# Patient Record
Sex: MALE | Race: WHITE | NOT HISPANIC OR LATINO
[De-identification: names, ages, dates, MRNs, and addresses within clinical notes are randomized per-mention and may not be internally consistent; named-entity substitution may affect disease eponyms.]

---

## 2018-06-30 ENCOUNTER — CHARTING TRANS (OUTPATIENT)
Dept: OTHER | Age: 48
End: 2018-06-30

## 2018-06-30 ASSESSMENT — PAIN SCALES - GENERAL: PAINLEVEL_OUTOF10: 0

## 2018-10-31 VITALS
WEIGHT: 210 LBS | TEMPERATURE: 98.1 F | SYSTOLIC BLOOD PRESSURE: 120 MMHG | BODY MASS INDEX: 26.11 KG/M2 | HEART RATE: 71 BPM | HEIGHT: 75 IN | DIASTOLIC BLOOD PRESSURE: 82 MMHG | RESPIRATION RATE: 18 BRPM

## 2018-12-06 ENCOUNTER — WALK IN (OUTPATIENT)
Dept: URGENT CARE | Age: 48
End: 2018-12-06

## 2018-12-06 VITALS
HEART RATE: 69 BPM | BODY MASS INDEX: 27.71 KG/M2 | OXYGEN SATURATION: 96 % | WEIGHT: 215.94 LBS | SYSTOLIC BLOOD PRESSURE: 104 MMHG | HEIGHT: 74 IN | RESPIRATION RATE: 20 BRPM | TEMPERATURE: 98.2 F | DIASTOLIC BLOOD PRESSURE: 86 MMHG

## 2018-12-06 DIAGNOSIS — J02.9 VIRAL PHARYNGITIS: Primary | ICD-10-CM

## 2018-12-06 LAB
FLUAV AG UPPER RESP QL IA.RAPID: NEGATIVE
FLUBV AG UPPER RESP QL IA.RAPID: NEGATIVE
S PYO AG THROAT QL: NEGATIVE

## 2018-12-06 PROCEDURE — 99204 OFFICE O/P NEW MOD 45 MIN: CPT | Performed by: NURSE PRACTITIONER

## 2018-12-06 PROCEDURE — 87804 INFLUENZA ASSAY W/OPTIC: CPT | Performed by: NURSE PRACTITIONER

## 2018-12-06 PROCEDURE — 87880 STREP A ASSAY W/OPTIC: CPT | Performed by: NURSE PRACTITIONER

## 2018-12-06 ASSESSMENT — ENCOUNTER SYMPTOMS
RHINORRHEA: 0
FATIGUE: 1
CHILLS: 1
NAUSEA: 0
SINUS PAIN: 0
SORE THROAT: 1
VOMITING: 0
SINUS PRESSURE: 0
ABDOMINAL PAIN: 0
COUGH: 0

## 2023-02-22 ENCOUNTER — ORDER TRANSCRIPTION (OUTPATIENT)
Dept: ADMINISTRATIVE | Facility: HOSPITAL | Age: 53
End: 2023-02-22

## 2023-02-22 DIAGNOSIS — Z13.6 SCREENING FOR CARDIOVASCULAR CONDITION: Primary | ICD-10-CM

## 2023-03-07 ENCOUNTER — HOSPITAL ENCOUNTER (OUTPATIENT)
Dept: CT IMAGING | Age: 53
Discharge: HOME | End: 2023-03-07
Attending: INTERNAL MEDICINE

## 2023-03-07 DIAGNOSIS — Z13.6 SCREENING FOR CARDIOVASCULAR CONDITION: ICD-10-CM

## 2023-03-07 NOTE — PROGRESS NOTES
Date of Service 3/7/2023    JULES BROUSSARD  Date of Birth 8/17/1970    Patient Age: 47year old    PCP: MD Amanda Lylesharvinder [phone]  [facility] [address]    CARDIOLOGIST:  Dr. Booker Romeo Type  Type Scan/Screening: Heart Scan  Preliminary Heart Scan Score: 0 (Last Heart Scan done in 2016; score=0)           Lipid Profile  Cholesterol: 163, done on 6/17/2021. HDL Cholesterol: 41, done on 6/17/2021. LDL Cholesterol: 107, done on 6/17/2021. TriGlycerides 74, done on 6/17/2021. Recent Cholesterol Labs done in December 2022. Nurse Review  Risk factor information and results reviewed with Nurse: Yes    Recommended Follow Up:  Consult your physician regarding[de-identified]   Final Heart Scan Report; Discuss potential for Incidental Finding    Free PV Screening offered to patient. (Patient to be scheduled for a free PV Stroke Screening Carotid and Abdominal Aorta Ultrasound.)      Recommendations for Change:  Nutrition Changes: Low Saturated Fat;Low Fat Dairy; Increase Fiber     Cholesterol Modification (goal of therapy depends upon your risk):   Decrease LDL (Lousy/Bad) Ideal <100    Exercise: Enhance Current Program           Repeat Heart Scan: 5 years if Calcium Score is 0.0     Other[de-identified] Today's blood pressure:  112/72 (left arm)    [facility]avian Recommended Resources:  Recommended Resources: Upcoming Classes, Medical Services and [facility]. Health. Sarah Fernandez, RN        Please Contact the Nurse Heart Line with any Questions or Concerns [phone].

## 2023-03-08 ENCOUNTER — ORDER TRANSCRIPTION (OUTPATIENT)
Dept: ADMINISTRATIVE | Facility: HOSPITAL | Age: 53
End: 2023-03-08

## 2023-03-08 DIAGNOSIS — Z13.9 ENCOUNTER FOR SCREENING: Primary | ICD-10-CM

## 2023-06-09 ENCOUNTER — HOSPITAL ENCOUNTER (OUTPATIENT)
Dept: ULTRASOUND IMAGING | Age: 53
Discharge: HOME | End: 2023-06-09
Attending: FAMILY MEDICINE

## 2023-06-09 DIAGNOSIS — Z13.9 ENCOUNTER FOR SCREENING: ICD-10-CM

## 2023-06-09 NOTE — PROGRESS NOTES
Date of Service 6/9/2023    JULES BROUSSARD  Date of Birth 8/17/1970    Patient Age: 47year old    [facility] [address] [address]    Consult Type  Type Scan/Screening: PV Screening (No abdominal aortic aneurysm.)        Left Carotid Artery: Normal  Right Carotid Artery: < 50% Narrowing         Nurse Review  Risk factor information and results reviewed with Nurse: Yes    Recommended Follow Up:  Consult your physician regarding[de-identified]   Final Peripheral Vascular Stroke Screen Report; Discuss potential for Incidental Finding        Recommendations for Change:  Nutrition Changes: Low Saturated Fat;Low Fat Dairy; Increase Fiber     Cholesterol Modification (goal of therapy depends upon your risk): Increase HDL (Healthy/Good) Normal >45 Men >55 Women;  Decrease LDL (Lousy/Bad) Ideal <100    Exercise: Enhance Current Program              Repeat PV Screening:   3 Years; Aorta Only; Abnormal PV Screening results indicates a need for additional testing, to be ordered by your PCP       [facility]avian Recommended Resources:  Recommended Resources: Upcoming Classes, Medical Services and [facility]. Health. Jerrod Hernandez RN        Please Contact the Nurse Heart Line with any Questions or Concerns [phone].

## 2023-07-15 ENCOUNTER — APPOINTMENT (OUTPATIENT)
Dept: GENERAL RADIOLOGY | Facility: HOSPITAL | Age: 53
End: 2023-07-15
Payer: COMMERCIAL

## 2023-07-15 ENCOUNTER — HOSPITAL ENCOUNTER (EMERGENCY)
Facility: HOSPITAL | Age: 53
Discharge: HOME | End: 2023-07-15
Attending: EMERGENCY MEDICINE
Payer: COMMERCIAL

## 2023-07-15 ENCOUNTER — APPOINTMENT (OUTPATIENT)
Dept: CT IMAGING | Facility: HOSPITAL | Age: 53
End: 2023-07-15
Attending: EMERGENCY MEDICINE
Payer: COMMERCIAL

## 2023-07-15 VITALS
SYSTOLIC BLOOD PRESSURE: 118 MMHG | TEMPERATURE: 98 F | WEIGHT: 206 LBS | HEIGHT: 74 IN | DIASTOLIC BLOOD PRESSURE: 88 MMHG | HEART RATE: 69 BPM | OXYGEN SATURATION: 100 % | BODY MASS INDEX: 26.44 KG/M2 | RESPIRATION RATE: 20 BRPM

## 2023-07-15 DIAGNOSIS — R04.2 HEMOPTYSIS: Primary | ICD-10-CM

## 2023-07-15 LAB
ALBUMIN SERPL-MCNC: 3.9 G/DL (ref 3.4–5)
ALBUMIN/GLOB SERPL: 1 {RATIO} (ref 1–2)
ALP LIVER SERPL-CCNC: 63 U/L
ALT SERPL-CCNC: 34 U/L
ANION GAP SERPL CALC-SCNC: 5 MMOL/L (ref 0–18)
ANTIBODY SCREEN: NEGATIVE
APTT PPP: 27.4 SECONDS (ref 23.3–35.6)
AST SERPL-CCNC: 16 U/L (ref 15–37)
BASOPHILS # BLD AUTO: 0.02 X10(3) UL (ref 0–0.2)
BASOPHILS NFR BLD AUTO: 0.6 %
BILIRUB SERPL-MCNC: 0.2 MG/DL (ref 0.1–2)
BUN BLD-MCNC: 18 MG/DL (ref 7–18)
CALCIUM BLD-MCNC: 9.1 MG/DL (ref 8.5–10.1)
CHLORIDE SERPL-SCNC: 107 MMOL/L (ref 98–112)
CO2 SERPL-SCNC: 29 MMOL/L (ref 21–32)
CREAT BLD-MCNC: 1 MG/DL
EOSINOPHIL # BLD AUTO: 0.1 X10(3) UL (ref 0–0.7)
EOSINOPHIL NFR BLD AUTO: 3.2 %
ERYTHROCYTE [DISTWIDTH] IN BLOOD BY AUTOMATED COUNT: 12.5 %
GFR SERPLBLD BASED ON 1.73 SQ M-ARVRAT: 91 ML/MIN/1.73M2 (ref 60–?)
GLOBULIN PLAS-MCNC: 3.9 G/DL (ref 2.8–4.4)
GLUCOSE BLD-MCNC: 117 MG/DL (ref 70–99)
HCT VFR BLD AUTO: 45 %
HGB BLD-MCNC: 14.1 G/DL
IMM GRANULOCYTES # BLD AUTO: 0 X10(3) UL (ref 0–1)
IMM GRANULOCYTES NFR BLD: 0 %
INR BLD: 0.88 (ref 0.85–1.16)
LACTATE SERPL-SCNC: 1.1 MMOL/L (ref 0.4–2)
LYMPHOCYTES # BLD AUTO: 1.31 X10(3) UL (ref 1–4)
LYMPHOCYTES NFR BLD AUTO: 41.7 %
MCH RBC QN AUTO: 25.7 PG (ref 26–34)
MCHC RBC AUTO-ENTMCNC: 31.3 G/DL (ref 31–37)
MCV RBC AUTO: 82.1 FL
MONOCYTES # BLD AUTO: 0.21 X10(3) UL (ref 0.1–1)
MONOCYTES NFR BLD AUTO: 6.7 %
NEUTROPHILS # BLD AUTO: 1.5 X10 (3) UL (ref 1.5–7.7)
NEUTROPHILS # BLD AUTO: 1.5 X10(3) UL (ref 1.5–7.7)
NEUTROPHILS NFR BLD AUTO: 47.8 %
OSMOLALITY SERPL CALC.SUM OF ELEC: 295 MOSM/KG (ref 275–295)
PLATELET # BLD AUTO: 237 10(3)UL (ref 150–450)
POTASSIUM SERPL-SCNC: 4 MMOL/L (ref 3.5–5.1)
PROT SERPL-MCNC: 7.8 G/DL (ref 6.4–8.2)
PROTHROMBIN TIME: 12 SECONDS (ref 11.6–14.8)
RBC # BLD AUTO: 5.48 X10(6)UL
RH BLOOD TYPE: POSITIVE
SODIUM SERPL-SCNC: 141 MMOL/L (ref 136–145)
TROPONIN I HIGH SENSITIVITY: 5 NG/L
WBC # BLD AUTO: 3.1 X10(3) UL (ref 4–11)

## 2023-07-15 PROCEDURE — 86850 RBC ANTIBODY SCREEN: CPT

## 2023-07-15 PROCEDURE — 85730 THROMBOPLASTIN TIME PARTIAL: CPT | Performed by: EMERGENCY MEDICINE

## 2023-07-15 PROCEDURE — 85610 PROTHROMBIN TIME: CPT | Performed by: EMERGENCY MEDICINE

## 2023-07-15 PROCEDURE — 86901 BLOOD TYPING SEROLOGIC RH(D): CPT

## 2023-07-15 PROCEDURE — 87040 BLOOD CULTURE FOR BACTERIA: CPT | Performed by: EMERGENCY MEDICINE

## 2023-07-15 PROCEDURE — 86900 BLOOD TYPING SEROLOGIC ABO: CPT

## 2023-07-15 PROCEDURE — 71260 CT THORAX DX C+: CPT | Performed by: EMERGENCY MEDICINE

## 2023-07-15 PROCEDURE — 71045 X-RAY EXAM CHEST 1 VIEW: CPT

## 2023-07-15 PROCEDURE — 83605 ASSAY OF LACTIC ACID: CPT | Performed by: EMERGENCY MEDICINE

## 2023-07-15 PROCEDURE — 84484 ASSAY OF TROPONIN QUANT: CPT

## 2023-07-15 PROCEDURE — 85025 COMPLETE CBC W/AUTO DIFF WBC: CPT

## 2023-07-15 PROCEDURE — 99284 EMERGENCY DEPT VISIT MOD MDM: CPT

## 2023-07-15 PROCEDURE — 36415 COLL VENOUS BLD VENIPUNCTURE: CPT

## 2023-07-15 PROCEDURE — 80053 COMPREHEN METABOLIC PANEL: CPT

## 2023-07-15 NOTE — ED INITIAL ASSESSMENT (HPI)
Pt here with co recent URI, congestion, cough and fatigue. Pt reports that this morning he started coughing up blood tinged sputum.

## 2025-02-22 ENCOUNTER — HOSPITAL ENCOUNTER (EMERGENCY)
Facility: HOSPITAL | Age: 55
Discharge: HOME | End: 2025-02-22
Attending: EMERGENCY MEDICINE
Payer: COMMERCIAL

## 2025-02-22 ENCOUNTER — APPOINTMENT (OUTPATIENT)
Dept: CT IMAGING | Facility: HOSPITAL | Age: 55
End: 2025-02-22
Attending: EMERGENCY MEDICINE
Payer: COMMERCIAL

## 2025-02-22 VITALS
TEMPERATURE: 97 F | SYSTOLIC BLOOD PRESSURE: 134 MMHG | RESPIRATION RATE: 18 BRPM | DIASTOLIC BLOOD PRESSURE: 84 MMHG | BODY MASS INDEX: 27.98 KG/M2 | OXYGEN SATURATION: 100 % | HEIGHT: 75 IN | HEART RATE: 70 BPM | WEIGHT: 225 LBS

## 2025-02-22 DIAGNOSIS — N23 RENAL COLIC: Primary | ICD-10-CM

## 2025-02-22 DIAGNOSIS — N20.0 KIDNEY STONE: ICD-10-CM

## 2025-02-22 LAB
ALBUMIN SERPL-MCNC: 4.7 G/DL (ref 3.2–4.8)
ALBUMIN/GLOB SERPL: 2.1 {RATIO} (ref 1–2)
ALP LIVER SERPL-CCNC: 61 U/L
ALT SERPL-CCNC: 39 U/L
ANION GAP SERPL CALC-SCNC: 8 MMOL/L (ref 0–18)
AST SERPL-CCNC: 26 U/L (ref ?–34)
BASOPHILS # BLD AUTO: 0.04 X10(3) UL (ref 0–0.2)
BASOPHILS NFR BLD AUTO: 0.3 %
BILIRUB SERPL-MCNC: 0.6 MG/DL (ref 0.3–1.2)
BILIRUB UR QL STRIP.AUTO: NEGATIVE
BUN BLD-MCNC: 14 MG/DL (ref 9–23)
CALCIUM BLD-MCNC: 9.9 MG/DL (ref 8.7–10.6)
CHLORIDE SERPL-SCNC: 101 MMOL/L (ref 98–112)
CLARITY UR REFRACT.AUTO: CLEAR
CO2 SERPL-SCNC: 28 MMOL/L (ref 21–32)
CREAT BLD-MCNC: 1.35 MG/DL
EGFRCR SERPLBLD CKD-EPI 2021: 63 ML/MIN/1.73M2 (ref 60–?)
EOSINOPHIL # BLD AUTO: 0.11 X10(3) UL (ref 0–0.7)
EOSINOPHIL NFR BLD AUTO: 0.9 %
ERYTHROCYTE [DISTWIDTH] IN BLOOD BY AUTOMATED COUNT: 12.6 %
GLOBULIN PLAS-MCNC: 2.2 G/DL (ref 2–3.5)
GLUCOSE BLD-MCNC: 136 MG/DL (ref 70–99)
GLUCOSE UR STRIP.AUTO-MCNC: NORMAL MG/DL
HCT VFR BLD AUTO: 40.7 %
HGB BLD-MCNC: 13.9 G/DL
IMM GRANULOCYTES # BLD AUTO: 0.03 X10(3) UL (ref 0–1)
IMM GRANULOCYTES NFR BLD: 0.3 %
KETONES UR STRIP.AUTO-MCNC: NEGATIVE MG/DL
LEUKOCYTE ESTERASE UR QL STRIP.AUTO: NEGATIVE
LYMPHOCYTES # BLD AUTO: 1.23 X10(3) UL (ref 1–4)
LYMPHOCYTES NFR BLD AUTO: 10.6 %
MCH RBC QN AUTO: 27.1 PG (ref 26–34)
MCHC RBC AUTO-ENTMCNC: 34.2 G/DL (ref 31–37)
MCV RBC AUTO: 79.3 FL
MONOCYTES # BLD AUTO: 0.73 X10(3) UL (ref 0.1–1)
MONOCYTES NFR BLD AUTO: 6.3 %
NEUTROPHILS # BLD AUTO: 9.46 X10 (3) UL (ref 1.5–7.7)
NEUTROPHILS # BLD AUTO: 9.46 X10(3) UL (ref 1.5–7.7)
NEUTROPHILS NFR BLD AUTO: 81.6 %
NITRITE UR QL STRIP.AUTO: NEGATIVE
OSMOLALITY SERPL CALC.SUM OF ELEC: 287 MOSM/KG (ref 275–295)
PH UR STRIP.AUTO: 5.5 [PH] (ref 5–8)
PLATELET # BLD AUTO: 223 10(3)UL (ref 150–450)
POTASSIUM SERPL-SCNC: 4.6 MMOL/L (ref 3.5–5.1)
PROT SERPL-MCNC: 6.9 G/DL (ref 5.7–8.2)
PROT UR STRIP.AUTO-MCNC: NEGATIVE MG/DL
RBC # BLD AUTO: 5.13 X10(6)UL
RBC UR QL AUTO: NEGATIVE
SODIUM SERPL-SCNC: 137 MMOL/L (ref 136–145)
SP GR UR STRIP.AUTO: 1.02 (ref 1–1.03)
UROBILINOGEN UR STRIP.AUTO-MCNC: NORMAL MG/DL
WBC # BLD AUTO: 11.6 X10(3) UL (ref 4–11)

## 2025-02-22 PROCEDURE — 99285 EMERGENCY DEPT VISIT HI MDM: CPT

## 2025-02-22 PROCEDURE — 99284 EMERGENCY DEPT VISIT MOD MDM: CPT

## 2025-02-22 PROCEDURE — 74176 CT ABD & PELVIS W/O CONTRAST: CPT | Performed by: EMERGENCY MEDICINE

## 2025-02-22 PROCEDURE — 85025 COMPLETE CBC W/AUTO DIFF WBC: CPT

## 2025-02-22 PROCEDURE — 81003 URINALYSIS AUTO W/O SCOPE: CPT | Performed by: EMERGENCY MEDICINE

## 2025-02-22 PROCEDURE — 80053 COMPREHEN METABOLIC PANEL: CPT

## 2025-02-22 PROCEDURE — 85025 COMPLETE CBC W/AUTO DIFF WBC: CPT | Performed by: EMERGENCY MEDICINE

## 2025-02-22 PROCEDURE — 96374 THER/PROPH/DIAG INJ IV PUSH: CPT

## 2025-02-22 PROCEDURE — 96361 HYDRATE IV INFUSION ADD-ON: CPT

## 2025-02-22 PROCEDURE — 80053 COMPREHEN METABOLIC PANEL: CPT | Performed by: EMERGENCY MEDICINE

## 2025-02-22 RX ORDER — ONDANSETRON 4 MG/1
4 TABLET, ORALLY DISINTEGRATING ORAL EVERY 4 HOURS PRN
Qty: 10 TABLET | Refills: 0 | Status: SHIPPED | OUTPATIENT
Start: 2025-02-22 | End: 2025-03-01

## 2025-02-22 RX ORDER — TAMSULOSIN HYDROCHLORIDE 0.4 MG/1
0.4 CAPSULE ORAL DAILY
Qty: 7 CAPSULE | Refills: 0 | Status: SHIPPED | OUTPATIENT
Start: 2025-02-22 | End: 2025-03-24

## 2025-02-22 RX ORDER — HYDROCODONE BITARTRATE AND ACETAMINOPHEN 5; 325 MG/1; MG/1
1-2 TABLET ORAL EVERY 4 HOURS PRN
Qty: 20 TABLET | Refills: 0 | Status: SHIPPED | OUTPATIENT
Start: 2025-02-22 | End: 2025-03-01

## 2025-02-22 RX ORDER — KETOROLAC TROMETHAMINE 30 MG/ML
30 INJECTION, SOLUTION INTRAMUSCULAR; INTRAVENOUS ONCE
Status: COMPLETED | OUTPATIENT
Start: 2025-02-22 | End: 2025-02-22

## 2025-02-22 RX ORDER — KETOROLAC TROMETHAMINE 15 MG/ML
15 INJECTION, SOLUTION INTRAMUSCULAR; INTRAVENOUS ONCE
Status: DISCONTINUED | OUTPATIENT
Start: 2025-02-22 | End: 2025-02-22

## 2025-02-22 RX ORDER — ONDANSETRON 2 MG/ML
4 INJECTION INTRAMUSCULAR; INTRAVENOUS ONCE
Status: DISCONTINUED | OUTPATIENT
Start: 2025-02-22 | End: 2025-02-22

## 2025-02-22 NOTE — ED PROVIDER NOTES
Patient Seen in: [facility] Emergency Department      History     Chief Complaint   Patient presents with    Abdomen/Flank Pain     Stated Complaint: left sided abdominal pain    Subjective:   HPI      54-year-old male, reports experiencing sudden and sharp pain, which he describes as \"killing\" him. The pain began abruptly and is located in the back and radiates down into the groin area. He has a history of kidney stones, with the last occurrence being at least a couple of years ago, and he has always passed them without the need for stents or lithotripsy. Despite the intense pain, he does not report any nausea, which he typically experiences with kidney stones if the pain is severe. He also mentions feeling constipated but confirms regular bowel movements.    Objective:     Past Medical History:    Acute sinusitis    AF (paroxysmal atrial fibrillation) ([facility])    11/4/2002 A-fib, 7/12/200 lone a-fib, dx: 2/1995 - follows with [facility]    Allergic rhinitis    Ankle pain    Ankle sprain    Arrhythmia    Back pain    lower thoracic, upper lumbar    Elevated cholesterol    Encounter for long-term (current) use of other medications    Finger dislocation    Rt, small finger dislocation as an acute injury on chronic fracture dislocation variant    Foot fracture    3/28/2000 fifth metatarsal tuberosity fracture    Heart palpitations    History of chicken pox    Hyperlipidemia    Jammed finger (interphalangeal joint)    multiple, from playing volleyball and basketball    Lumbar disc narrowing    mild disc space narrowing L5-S1 posteriorly    Other dyspnea and respiratory abnormality    Pain in limb    Pes planovalgus    Sprain of finger, right    small finger, acute-on-chronic sprain of PIP joint    Tendonitis    diffuse, second MPJ metatarsalgia              Past Surgical History:   Procedure Laterality Date    Hand/finger surgery unlisted      hand surgery after trauma in the past                Social History      Socioeconomic History    Marital status:    Tobacco Use    Smoking status: Never    Smokeless tobacco: Never   Vaping Use    Vaping status: Never Used   Substance and Sexual Activity    Alcohol use: Yes     Comment: socially     Drug use: No    Sexual activity: Yes     Partners: Female   Other Topics Concern    Caffeine Concern No    Exercise Yes    Seat Belt Yes                  Physical Exam     ED Triage Vitals [02/22/25 0328]   /81   Pulse 83   Resp 18   Temp 96.9 °F (36.1 °C)   Temp src Temporal   SpO2 98 %   O2 Device None (Room air)       Current Vitals:   Vital Signs  BP: 134/84  Pulse: 70  Resp: 18  Temp: 96.9 °F (36.1 °C)  Temp src: Temporal    Oxygen Therapy  SpO2: 100 %  O2 Device: None (Room air)        Physical Exam    Vital signs reviewed  General appearance: Patient is alert and in moderate to severe pain distress  HEENT: Pupils equal react to light extraocular muscles intact no scleral icterus, mucous membranes are moist, there is no erythema or exudate in the posterior pharynx  Neck: Supple no JVD no lymphadenopathy no meningismus no carotid bruit  CV: Regular rate and rhythm no murmur rub  Respiratory: Clear to auscultation bilaterally no crackles no wheezes no accessory muscle use  Abdomen: Patient is guarding abdomen with exam but states does not feel any tenderness when pushing to states the pain from his back is causing his stomach to hurt no hepatosplenomegaly bowel sounds are present , no pulsatile mass  Extremities: No clubbing cyanosis or edema 2+ distal pulses.  Neuro: Cranial nerves II through XII intact with no gross focal sensory or motor abnormality.      ED Course     Labs Reviewed   CBC WITH DIFFERENTIAL WITH PLATELET - Abnormal; Notable for the following components:       Result Value    WBC 11.6 (*)     MCV 79.3 (*)     Neutrophil Absolute Prelim 9.46 (*)     Neutrophil Absolute 9.46 (*)     All other components within normal limits   COMP METABOLIC PANEL (14) -  Abnormal; Notable for the following components:    Glucose 136 (*)     Creatinine 1.35 (*)     A/G Ratio 2.1 (*)     All other components within normal limits   URINALYSIS WITH CULTURE REFLEX   RAINBOW DRAW LAVENDER   RAINBOW DRAW LIGHT GREEN   RAINBOW DRAW BLUE   RAINBOW DRAW GOLD            Patient was evaluated had a CBC chemistry and urinalysis.  The urinalysis was completely unremarkable white count was 11.6 and creatinine was 1.35.  Was given some IV fluids and will get a CT scan without contrast to assess for kidney stone as he states that the symptoms are very similar as the symptoms are now radiating into his testicles     CT ABDOMEN AND PELVIS WITHOUT CONTRAST    COMPARISON: None    IMPRESSION:   3 mm stone at the left UVJ with associated mild left hydroureteronephrosis.  No other urolithiasis or right hydronephrosis.    No other acute abnormality in the abdomen or pelvis.  No appendicitis.    Patient does have a 3 mm stone left UVJ.  There is some left hydroureteronephrosis.  No sign of any urinary tract infection.  Will have him follow-up with urology.  Will give him Flomax Norco and Zofran.  He looks comfortable enough to send home and we will give him a strainer.    MDM      Differential diagnosis reflecting the complexity of care include: Renal colic, kidney stone, pyelonephritis, hydroureteronephrosis          My independent interpretation of studies of: CT scan shows a 3 mm stone left UVJ with some left hydroureteronephrosis no UTI      Shared decision making was done by myself and patient he did feel comfortable enough to go home.  Will be discharged follow-up with urology strain urine.  Return if any worsening pain            Medical Decision Making      Disposition and Plan     Clinical Impression:  1. Renal colic    2. Kidney stone         Disposition:  Discharge  2/22/2025  5:57 am    Follow-up:  [facility]   [address] [place] [id number]  [phone]  Follow  up            Medications Prescribed:  Discharge Medication List as of 2/22/2025  6:10 AM        START taking these medications    Details   tamsulosin (FLOMAX) 0.4 MG Oral Cap Take 1 capsule (0.4 mg total) by mouth daily., Normal, Disp-7 capsule, R-0      HYDROcodone-acetaminophen 5-325 MG Oral Tab Take 1-2 tablets by mouth every 4 (four) hours as needed for Pain., Normal, Disp-20 tablet, R-0      ondansetron 4 MG Oral Tablet Dispersible Take 1 tablet (4 mg total) by mouth every 4 (four) hours as needed for Nausea., Normal, Disp-10 tablet, R-0                 Supplementary Documentation: Patient was screened and evaluated during this visit.  As the treating physician attending to the patient, I determined within reasonable clinical confidence and prior to discharge, that an emergency medical condition was not or was no longer present.  There was no indication for further evaluation, treatment, or admission on an emergency basis.  Comprehensive verbal and written discharge and follow-up instructions were provided to help prevent relapse or worsening.  Patient was instructed to follow-up with primary care provider for further evaluation treatment, return immediately to ER for worsening, concerning, new, or changing/persisting symptoms.  I discussed the case with the patient and they had no questions, complaints, or concerns.  Patient was comfortable going home.      Dictation Disclaimer Note:   To increase efficiency this document may have been prepared using voice recognition technology. Every effort has been made to correct any errors made during preparation of this note. However, if a word or phrase is confusing, or does not make sense, this is likely due to a recognition error within the program which was not discovered during editing. Please do not hesitate to contact to address any significant errors.    Note to Patient:   The 21st Century Cures Act makes medical notes like these available to patients in the  interest of transparency. Please be advised this is a medical document. Medical documents are intended to carry relevant information, facts as evident, and the clinical opinion of the practitioner. The medical note is intended as peer to peer communication and may appear blunt or direct. It is written in medical language and may contain abbreviations or verbiage that are unfamiliar.

## 2025-02-22 NOTE — ED INITIAL ASSESSMENT (HPI)
Pt to the emergency room for left sided abdominal/flank pain since 9pm. Per pt he has been trying to walk around to relieve the pain with no improvement. Pt notes that the pain has been worsening all night and that it has been traveling toward his back. Pt states history of kidney stones, which feels similar but no quite the same. No trouble urinating. No vomiting, no diarrhea. Pt took ibuprofen 200 mg with ni improvement in pain.

## 2025-03-07 ENCOUNTER — LABORATORY ENCOUNTER (OUTPATIENT)
Dept: LAB | Facility: HOSPITAL | Age: 55
End: 2025-03-07
Attending: UROLOGY
Payer: COMMERCIAL

## 2025-03-07 DIAGNOSIS — Z01.818 ENCOUNTER FOR PREADMISSION TESTING: ICD-10-CM

## 2025-03-07 LAB
ANTIBODY SCREEN: NEGATIVE
RH BLOOD TYPE: POSITIVE

## 2025-03-07 PROCEDURE — 86900 BLOOD TYPING SEROLOGIC ABO: CPT

## 2025-03-07 PROCEDURE — 86850 RBC ANTIBODY SCREEN: CPT

## 2025-03-07 PROCEDURE — 86901 BLOOD TYPING SEROLOGIC RH(D): CPT

## 2025-03-14 ENCOUNTER — HOSPITAL ENCOUNTER (OUTPATIENT)
Facility: HOSPITAL | Age: 55
Discharge: HOME | End: 2025-03-15
Attending: UROLOGY | Admitting: UROLOGY
Payer: COMMERCIAL

## 2025-03-14 ENCOUNTER — ANESTHESIA EVENT (OUTPATIENT)
Dept: SURGERY | Facility: HOSPITAL | Age: 55
End: 2025-03-14
Payer: COMMERCIAL

## 2025-03-14 ENCOUNTER — ANESTHESIA (OUTPATIENT)
Dept: SURGERY | Facility: HOSPITAL | Age: 55
End: 2025-03-14
Payer: COMMERCIAL

## 2025-03-14 DIAGNOSIS — Z01.818 ENCOUNTER FOR PREADMISSION TESTING: ICD-10-CM

## 2025-03-14 DIAGNOSIS — N28.89 LEFT KIDNEY MASS: Primary | ICD-10-CM

## 2025-03-14 PROCEDURE — 0TB14ZZ EXCISION OF LEFT KIDNEY, PERCUTANEOUS ENDOSCOPIC APPROACH: ICD-10-PCS | Performed by: UROLOGY

## 2025-03-14 PROCEDURE — 88342 IMHCHEM/IMCYTCHM 1ST ANTB: CPT | Performed by: UROLOGY

## 2025-03-14 PROCEDURE — 8E0W4CZ ROBOTIC ASSISTED PROCEDURE OF TRUNK REGION, PERCUTANEOUS ENDOSCOPIC APPROACH: ICD-10-PCS | Performed by: UROLOGY

## 2025-03-14 PROCEDURE — 88307 TISSUE EXAM BY PATHOLOGIST: CPT | Performed by: UROLOGY

## 2025-03-14 PROCEDURE — 88341 IMHCHEM/IMCYTCHM EA ADD ANTB: CPT | Performed by: UROLOGY

## 2025-03-14 PROCEDURE — 76942 ECHO GUIDE FOR BIOPSY: CPT | Performed by: ANESTHESIOLOGY

## 2025-03-14 RX ORDER — NALOXONE HYDROCHLORIDE 0.4 MG/ML
0.08 INJECTION, SOLUTION INTRAMUSCULAR; INTRAVENOUS; SUBCUTANEOUS AS NEEDED
Status: DISCONTINUED | OUTPATIENT
Start: 2025-03-14 | End: 2025-03-14 | Stop reason: HOSPADM

## 2025-03-14 RX ORDER — FAMOTIDINE 20 MG/1
20 TABLET, FILM COATED ORAL 2 TIMES DAILY
Status: DISCONTINUED | OUTPATIENT
Start: 2025-03-14 | End: 2025-03-15

## 2025-03-14 RX ORDER — SENNOSIDES 8.6 MG
17.2 TABLET ORAL NIGHTLY
Status: DISCONTINUED | OUTPATIENT
Start: 2025-03-14 | End: 2025-03-15

## 2025-03-14 RX ORDER — HYDROMORPHONE HYDROCHLORIDE 1 MG/ML
0.6 INJECTION, SOLUTION INTRAMUSCULAR; INTRAVENOUS; SUBCUTANEOUS EVERY 5 MIN PRN
Status: DISCONTINUED | OUTPATIENT
Start: 2025-03-14 | End: 2025-03-14 | Stop reason: HOSPADM

## 2025-03-14 RX ORDER — PROCHLORPERAZINE EDISYLATE 5 MG/ML
5 INJECTION INTRAMUSCULAR; INTRAVENOUS EVERY 8 HOURS PRN
Status: DISCONTINUED | OUTPATIENT
Start: 2025-03-14 | End: 2025-03-15

## 2025-03-14 RX ORDER — OXYCODONE HYDROCHLORIDE 5 MG/1
5 TABLET ORAL EVERY 4 HOURS PRN
Status: DISCONTINUED | OUTPATIENT
Start: 2025-03-14 | End: 2025-03-15

## 2025-03-14 RX ORDER — HEPARIN SODIUM 5000 [USP'U]/ML
INJECTION, SOLUTION INTRAVENOUS; SUBCUTANEOUS
Status: COMPLETED
Start: 2025-03-14 | End: 2025-03-14

## 2025-03-14 RX ORDER — DEXAMETHASONE SODIUM PHOSPHATE 4 MG/ML
VIAL (ML) INJECTION AS NEEDED
Status: DISCONTINUED | OUTPATIENT
Start: 2025-03-14 | End: 2025-03-14 | Stop reason: SURG

## 2025-03-14 RX ORDER — METOCLOPRAMIDE HYDROCHLORIDE 5 MG/ML
INJECTION INTRAMUSCULAR; INTRAVENOUS AS NEEDED
Status: DISCONTINUED | OUTPATIENT
Start: 2025-03-14 | End: 2025-03-14 | Stop reason: SURG

## 2025-03-14 RX ORDER — SODIUM CHLORIDE, SODIUM LACTATE, POTASSIUM CHLORIDE, CALCIUM CHLORIDE 600; 310; 30; 20 MG/100ML; MG/100ML; MG/100ML; MG/100ML
INJECTION, SOLUTION INTRAVENOUS CONTINUOUS
Status: DISCONTINUED | OUTPATIENT
Start: 2025-03-14 | End: 2025-03-14 | Stop reason: HOSPADM

## 2025-03-14 RX ORDER — SODIUM CHLORIDE 9 MG/ML
INJECTION, SOLUTION INTRAVENOUS CONTINUOUS
Status: DISCONTINUED | OUTPATIENT
Start: 2025-03-14 | End: 2025-03-15

## 2025-03-14 RX ORDER — FAMOTIDINE 10 MG/ML
20 INJECTION, SOLUTION INTRAVENOUS 2 TIMES DAILY
Status: DISCONTINUED | OUTPATIENT
Start: 2025-03-14 | End: 2025-03-15

## 2025-03-14 RX ORDER — ROCURONIUM BROMIDE 10 MG/ML
INJECTION, SOLUTION INTRAVENOUS AS NEEDED
Status: DISCONTINUED | OUTPATIENT
Start: 2025-03-14 | End: 2025-03-14 | Stop reason: SURG

## 2025-03-14 RX ORDER — HYDROMORPHONE HYDROCHLORIDE 1 MG/ML
0.8 INJECTION, SOLUTION INTRAMUSCULAR; INTRAVENOUS; SUBCUTANEOUS EVERY 2 HOUR PRN
Status: DISCONTINUED | OUTPATIENT
Start: 2025-03-14 | End: 2025-03-15

## 2025-03-14 RX ORDER — ONDANSETRON 2 MG/ML
4 INJECTION INTRAMUSCULAR; INTRAVENOUS EVERY 6 HOURS PRN
Status: DISCONTINUED | OUTPATIENT
Start: 2025-03-14 | End: 2025-03-14 | Stop reason: HOSPADM

## 2025-03-14 RX ORDER — DIGOXIN 125 MCG
125 TABLET ORAL DAILY
COMMUNITY

## 2025-03-14 RX ORDER — SODIUM CHLORIDE, SODIUM LACTATE, POTASSIUM CHLORIDE, CALCIUM CHLORIDE 600; 310; 30; 20 MG/100ML; MG/100ML; MG/100ML; MG/100ML
INJECTION, SOLUTION INTRAVENOUS CONTINUOUS
Status: DISCONTINUED | OUTPATIENT
Start: 2025-03-14 | End: 2025-03-14

## 2025-03-14 RX ORDER — KETAMINE HYDROCHLORIDE 50 MG/ML
INJECTION, SOLUTION INTRAMUSCULAR; INTRAVENOUS AS NEEDED
Status: DISCONTINUED | OUTPATIENT
Start: 2025-03-14 | End: 2025-03-14 | Stop reason: SURG

## 2025-03-14 RX ORDER — ACETAMINOPHEN 325 MG/1
650 TABLET ORAL
Status: DISCONTINUED | OUTPATIENT
Start: 2025-03-14 | End: 2025-03-15

## 2025-03-14 RX ORDER — DIGOXIN 125 MCG
125 TABLET ORAL DAILY
Status: DISCONTINUED | OUTPATIENT
Start: 2025-03-14 | End: 2025-03-15

## 2025-03-14 RX ORDER — MEPERIDINE HYDROCHLORIDE 25 MG/ML
25 INJECTION INTRAMUSCULAR; INTRAVENOUS; SUBCUTANEOUS
Status: DISCONTINUED | OUTPATIENT
Start: 2025-03-14 | End: 2025-03-14 | Stop reason: HOSPADM

## 2025-03-14 RX ORDER — MIDAZOLAM HYDROCHLORIDE 1 MG/ML
INJECTION INTRAMUSCULAR; INTRAVENOUS AS NEEDED
Status: DISCONTINUED | OUTPATIENT
Start: 2025-03-14 | End: 2025-03-14 | Stop reason: SURG

## 2025-03-14 RX ORDER — HYDROMORPHONE HYDROCHLORIDE 1 MG/ML
0.4 INJECTION, SOLUTION INTRAMUSCULAR; INTRAVENOUS; SUBCUTANEOUS EVERY 2 HOUR PRN
Status: DISCONTINUED | OUTPATIENT
Start: 2025-03-14 | End: 2025-03-15

## 2025-03-14 RX ORDER — BUPIVACAINE HYDROCHLORIDE 2.5 MG/ML
INJECTION, SOLUTION EPIDURAL; INFILTRATION; INTRACAUDAL; PERINEURAL AS NEEDED
Status: DISCONTINUED | OUTPATIENT
Start: 2025-03-14 | End: 2025-03-14 | Stop reason: HOSPADM

## 2025-03-14 RX ORDER — OXYCODONE HYDROCHLORIDE 10 MG/1
10 TABLET ORAL EVERY 4 HOURS PRN
Status: DISCONTINUED | OUTPATIENT
Start: 2025-03-14 | End: 2025-03-15

## 2025-03-14 RX ORDER — HYDROMORPHONE HYDROCHLORIDE 1 MG/ML
0.2 INJECTION, SOLUTION INTRAMUSCULAR; INTRAVENOUS; SUBCUTANEOUS EVERY 5 MIN PRN
Status: DISCONTINUED | OUTPATIENT
Start: 2025-03-14 | End: 2025-03-14 | Stop reason: HOSPADM

## 2025-03-14 RX ORDER — ACETAMINOPHEN 500 MG
1000 TABLET ORAL ONCE
Status: DISCONTINUED | OUTPATIENT
Start: 2025-03-14 | End: 2025-03-14 | Stop reason: HOSPADM

## 2025-03-14 RX ORDER — PROCHLORPERAZINE EDISYLATE 5 MG/ML
5 INJECTION INTRAMUSCULAR; INTRAVENOUS EVERY 8 HOURS PRN
Status: DISCONTINUED | OUTPATIENT
Start: 2025-03-14 | End: 2025-03-14 | Stop reason: HOSPADM

## 2025-03-14 RX ORDER — MIDAZOLAM HYDROCHLORIDE 1 MG/ML
1 INJECTION INTRAMUSCULAR; INTRAVENOUS EVERY 5 MIN PRN
Status: DISCONTINUED | OUTPATIENT
Start: 2025-03-14 | End: 2025-03-14 | Stop reason: HOSPADM

## 2025-03-14 RX ORDER — KETOROLAC TROMETHAMINE 30 MG/ML
INJECTION, SOLUTION INTRAMUSCULAR; INTRAVENOUS AS NEEDED
Status: DISCONTINUED | OUTPATIENT
Start: 2025-03-14 | End: 2025-03-14 | Stop reason: SURG

## 2025-03-14 RX ORDER — HEPARIN SODIUM 5000 [USP'U]/ML
5000 INJECTION, SOLUTION INTRAVENOUS; SUBCUTANEOUS ONCE
Status: COMPLETED | OUTPATIENT
Start: 2025-03-14 | End: 2025-03-14

## 2025-03-14 RX ORDER — ATORVASTATIN CALCIUM 40 MG/1
40 TABLET, FILM COATED ORAL NIGHTLY
Status: DISCONTINUED | OUTPATIENT
Start: 2025-03-14 | End: 2025-03-15

## 2025-03-14 RX ORDER — ATORVASTATIN CALCIUM 40 MG/1
40 TABLET, FILM COATED ORAL DAILY
COMMUNITY

## 2025-03-14 RX ORDER — DOCUSATE SODIUM 100 MG/1
100 CAPSULE, LIQUID FILLED ORAL 2 TIMES DAILY
Status: DISCONTINUED | OUTPATIENT
Start: 2025-03-14 | End: 2025-03-15

## 2025-03-14 RX ORDER — ONDANSETRON 2 MG/ML
4 INJECTION INTRAMUSCULAR; INTRAVENOUS EVERY 6 HOURS PRN
Status: DISCONTINUED | OUTPATIENT
Start: 2025-03-14 | End: 2025-03-15

## 2025-03-14 RX ORDER — HYDROMORPHONE HYDROCHLORIDE 1 MG/ML
0.4 INJECTION, SOLUTION INTRAMUSCULAR; INTRAVENOUS; SUBCUTANEOUS EVERY 5 MIN PRN
Status: DISCONTINUED | OUTPATIENT
Start: 2025-03-14 | End: 2025-03-14 | Stop reason: HOSPADM

## 2025-03-14 RX ADMIN — ROCURONIUM BROMIDE 25 MG: 10 INJECTION, SOLUTION INTRAVENOUS at 13:25:00

## 2025-03-14 RX ADMIN — DEXAMETHASONE SODIUM PHOSPHATE 4 MG: 4 MG/ML VIAL (ML) INJECTION at 14:40:00

## 2025-03-14 RX ADMIN — KETAMINE HYDROCHLORIDE 100 MG: 50 INJECTION, SOLUTION INTRAMUSCULAR; INTRAVENOUS at 12:35:00

## 2025-03-14 RX ADMIN — SODIUM CHLORIDE, SODIUM LACTATE, POTASSIUM CHLORIDE, CALCIUM CHLORIDE: 600; 310; 30; 20 INJECTION, SOLUTION INTRAVENOUS at 14:35:00

## 2025-03-14 RX ADMIN — ROCURONIUM BROMIDE 50 MG: 10 INJECTION, SOLUTION INTRAVENOUS at 11:54:00

## 2025-03-14 RX ADMIN — MIDAZOLAM HYDROCHLORIDE 2 MG: 1 INJECTION INTRAMUSCULAR; INTRAVENOUS at 11:49:00

## 2025-03-14 RX ADMIN — ROCURONIUM BROMIDE 25 MG: 10 INJECTION, SOLUTION INTRAVENOUS at 12:35:00

## 2025-03-14 RX ADMIN — METOCLOPRAMIDE HYDROCHLORIDE 10 MG: 5 INJECTION INTRAMUSCULAR; INTRAVENOUS at 12:35:00

## 2025-03-14 RX ADMIN — KETOROLAC TROMETHAMINE 30 MG: 30 INJECTION, SOLUTION INTRAMUSCULAR; INTRAVENOUS at 14:40:00

## 2025-03-14 NOTE — ANESTHESIA PREPROCEDURE EVALUATION
PRE-OP EVALUATION    Patient Name: Angel Newberry    Admit Diagnosis: LEFT KIDNEY MASS    Pre-op Diagnosis: LEFT KIDNEY MASS    XI ROBOT-ASSISTED LAPAROSCOPIC LEFT PARTIAL NEPHRECTOMY    Anesthesia Procedure: XI ROBOT-ASSISTED LAPAROSCOPIC LEFT PARTIAL NEPHRECTOMY (Left)    Surgeons and Role:     * Jacey Kimball MD - Primary    Pre-op vitals reviewed.  Temp: 97.9 °F (36.6 °C)  Pulse: 86  Resp: 16  BP: 137/90  SpO2: 100 %  Body mass index is 26.62 kg/m².    Current medications reviewed.  Hospital Medications:   [Transfer Hold] acetaminophen (Tylenol Extra Strength) tab 1,000 mg  1,000 mg Oral Once    lactated ringers infusion   Intravenous Continuous    [COMPLETED] heparin (Porcine) 5000 UNIT/ML injection 5,000 Units  5,000 Units Subcutaneous Once    [COMPLETED] ceFAZolin (Ancef) 2g in 10mL IV syringe premix  2 g Intravenous Once    ceFAZolin (Ancef) 2 g/10mL IV syringe premix        bupivacaine PF (Marcaine) 0.25% injection    PRN       Outpatient Medications:   Prescriptions Prior to Admission[1]    Allergies: Latex      Anesthesia Evaluation    Patient summary reviewed.    Anesthetic Complications  (-) history of anesthetic complications         GI/Hepatic/Renal    Negative GI/hepatic/renal ROS.                             Cardiovascular                                    (+) dysrhythmias and atrial fibrillation                  Endo/Other    Negative endo/other ROS.                              Pulmonary    Negative pulmonary ROS.                       Neuro/Psych    Negative neuro/psych ROS.                                  Past Surgical History:   Procedure Laterality Date    Hand/finger surgery unlisted      hand surgery after trauma in the past     Social History     Socioeconomic History    Marital status:    Tobacco Use    Smoking status: Never    Smokeless tobacco: Never   Vaping Use    Vaping status: Never Used   Substance and Sexual Activity    Alcohol use: Yes     Comment: socially     Drug  use: No    Sexual activity: Yes     Partners: Female   Other Topics Concern    Caffeine Concern No    Exercise Yes    Seat Belt Yes     History   Drug Use No     Available pre-op labs reviewed.  Lab Results   Component Value Date    WBC 11.6 (H) 02/22/2025    RBC 5.13 02/22/2025    HGB 13.9 02/22/2025    HCT 40.7 02/22/2025    MCV 79.3 (L) 02/22/2025    MCH 27.1 02/22/2025    MCHC 34.2 02/22/2025    RDW 12.6 02/22/2025    .0 02/22/2025     Lab Results   Component Value Date     02/22/2025    K 4.6 02/22/2025     02/22/2025    CO2 28.0 02/22/2025    BUN 14 02/22/2025    CREATSERUM 1.35 (H) 02/22/2025     (H) 02/22/2025    CA 9.9 02/22/2025            Airway      Mallampati: I  Mouth opening: >3 FB  TM distance: > 6 cm  Neck ROM: full Cardiovascular    Cardiovascular exam normal.         Dental    Dentition appears grossly intact         Pulmonary    Pulmonary exam normal.                 Other findings              ASA: 2   Plan: general  NPO status verified and patient meets guidelines.    Post-procedure pain management plan discussed with surgeon and patient.  Surgeon requests: regional block    Plan/risks discussed with: patient and spouse                Present on Admission:  **None**             [1]   Medications Prior to Admission   Medication Sig Dispense Refill Last Dose/Taking    atorvastatin 40 MG Oral Tab Take 1 tablet (40 mg total) by mouth daily.   3/13/2025 at  8:00 AM    digoxin 0.125 MG Oral Tab Take 1 tablet (125 mcg total) by mouth daily.   3/13/2025 at  8:00 AM    Cholecalciferol 50 MCG (2000 UT) Oral Cap Take 1 capsule by mouth daily.   Past Week    aspirin 81 MG Oral Tab Take 1 tablet (81 mg total) by mouth daily.   3/3/2025

## 2025-03-14 NOTE — ANESTHESIA POSTPROCEDURE EVALUATION
[facility]    Angel Newberry Patient Status:  Outpatient in a Bed   Age/Gender 54 year old male MRN [id number]   Location [facility] POST ANESTHESIA CARE UNIT Attending Jacey Kimball MD   Hosp Day # 0 PCP Tristan Rutherford MD       Anesthesia Post-op Note    XI ROBOT-ASSISTED LAPAROSCOPIC LEFT PARTIAL NEPHRECTOMY    Procedure Summary       Date: 03/14/25 Room / Location:  MAIN OR 09 / [facility] MAIN OR    Anesthesia Start: 1149 Anesthesia Stop:     Procedure: XI ROBOT-ASSISTED LAPAROSCOPIC LEFT PARTIAL NEPHRECTOMY (Left) Diagnosis: (LEFT KIDNEY MASS)    Surgeons: Jacey Kimball MD Anesthesiologist: Tristan Galicia MD    Anesthesia Type: general ASA Status: 2            Anesthesia Type: general    Vitals Value Taken Time   /96 03/14/25 1504   Temp 97.9 03/14/25 1508   Pulse 82 03/14/25 1508   Resp 16 03/14/25 1508   SpO2 95 % 03/14/25 1508   Vitals shown include unfiled device data.        Patient Location: PACU    Anesthesia Type: general    Airway Patency: extubated    Postop Pain Control: adequate    Mental Status: mildly sedated but able to meaningfully participate in the post-anesthesia evaluation    Nausea/Vomiting: none    Cardiopulmonary/Hydration status: stable euvolemic    Complications: no apparent anesthesia related complications    Postop vital signs: stable    Dental Exam: Unchanged from Preop    Patient to be discharged from PACU when criteria met.

## 2025-03-14 NOTE — ANESTHESIA PROCEDURE NOTES
Regional Block    Date/Time: 3/14/2025 11:56 AM    Performed by: Tristan Galicia MD  Authorized by: Tristan Galicia MD      General Information and Staff    Start Time:  3/14/2025 11:56 AM  End Time:  3/14/2025 11:59 AM  Anesthesiologist:  Tristan Galicia MD  Performed by:  Anesthesiologist  Patient Location:  OR    Block Placement: Post Induction  Site Identification: real time ultrasound guided and image stored and retrievable    Block site/laterality marked before start: site marked  Reason for Block: at surgeon's request and post-op pain management    Preanesthetic Checklist: 2 patient identifers, IV checked, site marked, risks and benefits discussed, monitors and equipment checked, pre-op evaluation, timeout performed, anesthesia consent, sterile technique used, no prohibitive neurological deficits and no local skin infection at insertion site      Procedure Details    Patient Position:  Supine  Prep: ChloraPrep    Monitoring:  Cardiac monitor, continuous pulse ox, blood pressure cuff and heart rate  Block Type:  TAP  Laterality:  Bilateral  Injection Technique:  Single-shot    Needle    Needle Type:  Short-bevel and echogenic  Needle Gauge:  20 G  Needle Length:  110 mm  Needle Localization:  Ultrasound guidance  Reason for Ultrasound Use: appropriate spread of the medication was noted in real time and no ultrasound evidence of intravascular and/or intraneural injection            Assessment    Injection Assessment:  Good spread noted, negative resistance, negative aspiration for heme, incremental injection and low pressure  Heart Rate Change: No    - Patient tolerated block procedure well without evidence of immediate block related complications.     Medications  3/14/2025 11:56 AM      Additional Comments    Medication:  Ropivacaine 0.375% 40mL with 5mg PF dexamethasone

## 2025-03-14 NOTE — H&P
Pre-op Diagnosis: LEFT KIDNEY MASS    The above referenced H&P by MARIEL Schaefer from 2/25/25 was reviewed by Jacey Kimball MD on 3/14/2025, the patient was examined and no significant changes have occurred in the patient's condition since the H&P was performed.  I discussed with the patient and/or legal representative the potential benefits, risks and side effects of this procedure; the likelihood of the patient achieving goals; and potential problems that might occur during recuperation.  I discussed reasonable alternatives to the procedure, including risks, benefits and side effects related to the alternatives and risks related to not receiving this procedure.  We will proceed with procedure as planned.    TYSHAWN Kimball MD   3/14/25

## 2025-03-14 NOTE — ANESTHESIA PROCEDURE NOTES
Airway  Date/Time: 3/14/2025 11:55 AM  Urgency: elective    Airway not difficult    General Information and Staff    Patient location during procedure: OR  Anesthesiologist: Tristan Galicia MD  Performed: anesthesiologist   Performed by: Tristan Galicia MD  Authorized by: Tristan Galicia MD      Indications and Patient Condition  Indications for airway management: anesthesia  Spontaneous Ventilation: absent  Sedation level: deep  Preoxygenated: yes  Patient position: sniffing  Mask difficulty assessment: 1 - vent by mask    Final Airway Details  Final airway type: endotracheal airway      Successful airway: ETT  Cuffed: yes   Successful intubation technique: direct laryngoscopy  Facilitating devices/methods: intubating stylet  Endotracheal tube insertion site: oral  Blade: Kassandra  Blade size: #4  ETT size (mm): 8.0    Cormack-Lehane Classification: grade IIA - partial view of glottis  Placement verified by: capnometry   Cuff volume (mL): 10  Measured from: lips  Number of attempts at approach: 1  Ventilation between attempts: none  Number of other approaches attempted: 0

## 2025-03-14 NOTE — OPERATIVE REPORT
[facility] Urology                Operative Note      Angel Newberry Patient Status:  Outpatient in a Bed    1970 MRN [id number]   Formerly [facility] SURGERY Attending Jacey Kimball MD   Hosp Day # 0 PCP Tristan Rutherford MD         DATE OF SURGERY: 3/14/2025  PRIMARY SURGEON: Jacey Kimball MD  FIRST ASSISTANT (bedside): Lola Polanco    PREOPERATIVE DIAGNOSES:  left 6 cm kidney mass.   POSTOPERATIVE DIAGNOSES: left 6 cm kidney mass.    PROCEDURE PERFORMED:  Laparoscopic robotic-assisted left  partial nephrectomy.    COMPLICATIONS:  None.  WARM ISCHEMIA TIME: 35 min.   ESTIMATED BLOOD LOSS:  100 mL.  PATHOLOGICAL SPECIMEN: left kidney mass.  DRAINS:  15 Fr ELVA drain; 16-Georgian Johnson catheter.    Please note that Lola Polanco provided necessary first assistant services under my supervision throughout the robotic case.    INDICATIONS FOR PROCEDURE:  The patient was referred for evaluation of a left 6 cm renal mass.   Different treatment options including observation, laparoscopic  versus open partial nephrectmoy, radical nephrectomy,  cryo vs radiofrequency ablation were reviewed with the patient in great detail.  Patient elected to undergo a laparoscopic robotic-assisted partial nephrectomy after reviewing all risks, benefits, possible complications, and personel involved.  A consent was obtained.    PROCEDURE:  The patient was brought back to the operating room.  Patient was given intravenous antibiotics preoperatively.  Bilateral EPC cuffs were placed on the lower extremities.  General anesthesia was induced and the patient was then positioned in a right decubitus position with proper padding.  A 16 Fr Johnson was inserted.  The patient was prepped and draped in the sterile fashion.      Through a periumbilical incision, a Veress needle was inserted into the peritoneum and pneumoperitoneum was obtained without complications.  A 8-mm port was then placed through the same incision and  a camera was introduced.  No organ injury was observed.  The remaining ports were placed in standard fashion under direct vision including an 8-mm trocar subcostally, an 8 mm trocar in left lower abdomen,  and another 12-mm just inferior to the first camera port. The robotic arms were then attached.    We mobilized the left colon and slpeen medially.  The left gonadal vein was identified and followed cephalad to the left renal hilum.  We were able to identify the left ureter under the gonadal vein, and the space between these 2 structures and psoas muscle was visualized.  Blunt dissection was carried up to renal hilum lifting gently kidney cephalad.  The left renal vein had an ovbvious lumbar vein, which did not need to be divided. With further dissection, we identified two left renal arteries behind the left renal vein, and dissected them out to the point where vascular bull dog clamps could easily be placed.  The Gerotas fascia was noted to be thick and the kidney was completely defatted at this time in its anterior, posterior, and laterl plane.  This helped us identify the borders of the tumor/mass.  An intraop sterile laparoscopic ultrasound probe was necessary to clearly identify the tumor borders.     Two bulldog clamps were applied to the left renal artery and the vein was left open.  Total warm ischemia time was 35  min.  The mass was completely excised and sent to pathology.  We were able to close the kidney defect and calyx entry with 3-0 Vicryl V-lock and 0-Vicryl sutures.  There was good reperfusion of the kidney and no bleeding was noted from the surgical site.  Surgicel pieces were then applied in the bed of the incision area.  We subsequently closed Gerotas fascia using Weck clips.     The 12-mm assist port was then extended and the tumor was removed via an Endocatch bag.  Fascia was closed with 0-Vycril interrupted sutures.   The skin incisions were approximated using 4-0 Monocryl subcuticular  stitches.  There were no complications during the surgery.    I was present throughout the entirety of this procedure.      DISPOSITION:  The patient was transferred to PACU in stable condition and he was extubated without difficulty.  The patient will be admitted to hospital.    Jacey Kimball MD  [facility] Urology  3/14/2025

## 2025-03-14 NOTE — PLAN OF CARE
Patient admitted via bed.  Oriented to room.   Safety precautions initiated.   Bed in low position.   Call light in reach.    Consults notified.

## 2025-03-14 NOTE — CONSULTS
[facility]   part of [facility]    Medical Consult     Angel Newberry Patient Status:  Outpatient in a Bed    1970 MRN [id number]   Location [facility] 3NW-A Attending Jacey Kimball MD   Hosp Day # 0 PCP Tristan Rutherford MD     Chief Complaint: Left 6 cm kidney mass    History of Present Illness: Angel Newberry is a 54 year old male with history of atrial fibrillation, allergic rhinitis, chronic back pain, kidney stones, hyperlipidemia presenting with left 6 cm centimeter kidney mass status post laparoscopic robotic assisted left partial nephrectomy.  Patient denies any preoperative positive review of systems.  Patient denies any acute issues.  Patient's pain is controlled.    Past Medical History:  Past Medical History:    Acute sinusitis    AF (paroxysmal atrial fibrillation) ([facility])    2002 A-fib,  lone a-fib, dx: 1995 - follows with [facility]    Allergic rhinitis    Ankle pain    Ankle sprain    Arrhythmia    Back pain    lower thoracic, upper lumbar    Calculus of kidney    Elevated cholesterol    Encounter for long-term (current) use of other medications    Finger dislocation    Rt, small finger dislocation as an acute injury on chronic fracture dislocation variant    Foot fracture    3/28/2000 fifth metatarsal tuberosity fracture    Heart palpitations    High cholesterol    History of chicken pox    Hyperlipidemia    Jammed finger (interphalangeal joint)    multiple, from playing volleyball and basketball    Lumbar disc narrowing    mild disc space narrowing L5-S1 posteriorly    Other dyspnea and respiratory abnormality    Pain in limb    Pes planovalgus    Sprain of finger, right    small finger, acute-on-chronic sprain of PIP joint    Tendonitis    diffuse, second MPJ metatarsalgia        Past Surgical History:   Past Surgical History:   Procedure Laterality Date    Hand/finger surgery unlisted      hand surgery after trauma in the past       Social History:   reports that he has never smoked. He has never used smokeless tobacco. He reports current alcohol use. He reports that he does not use drugs.No illegal drugs, , 3 children,none working,, no cane or walker    Family History:   Family History   Problem Relation Age of Onset    Hypertension Mother     High Cholesterol Mother     Other (Other) Mother     Other (crohn's disease) Father     Other (crohn's disease) Sister     Cancer Neg     Diabetes Neg    Mother living  Father living     Allergies: Allergies[1]    Medications:  Medications Ordered Prior to Encounter[2]    Review of Systems:   A comprehensive 14 point review of systems was completed.    Pertinent positives and negatives noted in the HPI.    Physical Exam:    /79 (BP Location: Left arm)   Pulse 62   Temp 97.6 °F (36.4 °C) (Oral)   Resp 14   Ht 6' 3\" (1.905 m)   Wt 213 lb (96.6 kg)   SpO2 100%   BMI 26.62 kg/m²   General: No acute distress. Alert and oriented x 3.  HEENT: Normocephalic atraumatic. Moist mucous membranes. EOM-I.  Neck: No lymphadenopathy. No JVD. No carotid bruits.  Respiratory: Clear to auscultation bilaterally. No wheezes. No crackles  Cardiovascular: S1, S2. Regular rate and rhythm. No murmur  Chest and Back: No tenderness or deformity.  Abdomen: Soft, nontender, nondistended.  Positive bowel sounds.  Neurologic: No focal neurological deficits. CNII-XII grossly intact. Sensation and strength intact  Musculoskeletal: Moves all extremities.  Extremities: No edema or tenderness of the LE  Integument: No new rashes or lesions.   Psychiatric: Appropriate mood and affect.      ASSESSMENT / PLAN:    54 year old male with history of atrial fibrillation, allergic rhinitis, chronic back pain, kidney stones, hyperlipidemia presenting with left 6 cm centimeter kidney mass status post laparoscopic robotic assisted left partial nephrectomy.    Left 6 cm centimeter kidney mass  -POD # 0 status post laparoscopic robotic assisted left  partial nephrectomy.  -urology following    Post operative Pain  -acetaminophen po prn   -Hydromorphone iv  -oxy po prn     Atrial fibrillation   -digoxin    HLD  -atorvastatin     Quality:  DVT Prophylaxis: scd, eliquis  CODE status:   Johnson: none    Plan of care discussed with patient and staff    Dispo: no discharge    MD Edgar Melo Hospitalist   [phone]                           [1]   Allergies  Allergen Reactions    Latex RASH   [2]   Current Facility-Administered Medications on File Prior to Encounter   Medication Dose Route Frequency Provider Last Rate Last Admin    [COMPLETED] sodium chloride 0.9 % IV bolus 1,000 mL  1,000 mL Intravenous Once Sachin Dave MD   Stopped at 02/22/25 0559    [COMPLETED] ketorolac (Toradol) 30 MG/ML injection 30 mg  30 mg Intravenous Once Sachin Dave MD   30 mg at 02/22/25 0440     Current Outpatient Medications on File Prior to Encounter   Medication Sig Dispense Refill    atorvastatin 40 MG Oral Tab Take 1 tablet (40 mg total) by mouth daily.      digoxin 0.125 MG Oral Tab Take 1 tablet (125 mcg total) by mouth daily.      Cholecalciferol 50 MCG (2000 UT) Oral Cap Take 1 capsule by mouth daily.      [DISCONTINUED] digoxin (DIGOX) 0.25 MG Oral Tab Take 1 tablet (250 mcg total) by mouth daily. 90 tablet 3    [DISCONTINUED] atorvastatin 20 MG Oral Tab Take 1 tablet (20 mg total) by mouth every evening. (Patient taking differently: Take 2 tablets (40 mg total) by mouth every evening.) 90 tablet 3    aspirin 81 MG Oral Tab Take 1 tablet (81 mg total) by mouth daily.

## 2025-03-15 VITALS
SYSTOLIC BLOOD PRESSURE: 132 MMHG | HEIGHT: 75 IN | TEMPERATURE: 99 F | OXYGEN SATURATION: 100 % | HEART RATE: 78 BPM | BODY MASS INDEX: 26.49 KG/M2 | DIASTOLIC BLOOD PRESSURE: 72 MMHG | RESPIRATION RATE: 19 BRPM | WEIGHT: 213 LBS

## 2025-03-15 LAB
ANION GAP SERPL CALC-SCNC: 6 MMOL/L (ref 0–18)
BASOPHILS # BLD AUTO: 0.02 X10(3) UL (ref 0–0.2)
BASOPHILS NFR BLD AUTO: 0.2 %
BUN BLD-MCNC: 14 MG/DL (ref 9–23)
CALCIUM BLD-MCNC: 8.9 MG/DL (ref 8.7–10.6)
CHLORIDE SERPL-SCNC: 107 MMOL/L (ref 98–112)
CO2 SERPL-SCNC: 27 MMOL/L (ref 21–32)
CREAT BLD-MCNC: 1.41 MG/DL
CREAT FLD-MCNC: 1.2 MG/DL
EGFRCR SERPLBLD CKD-EPI 2021: 60 ML/MIN/1.73M2 (ref 60–?)
EOSINOPHIL # BLD AUTO: 0.01 X10(3) UL (ref 0–0.7)
EOSINOPHIL NFR BLD AUTO: 0.1 %
ERYTHROCYTE [DISTWIDTH] IN BLOOD BY AUTOMATED COUNT: 12.6 %
GLUCOSE BLD-MCNC: 128 MG/DL (ref 70–99)
HCT VFR BLD AUTO: 35.5 %
HGB BLD-MCNC: 11.9 G/DL
IMM GRANULOCYTES # BLD AUTO: 0.04 X10(3) UL (ref 0–1)
IMM GRANULOCYTES NFR BLD: 0.4 %
LYMPHOCYTES # BLD AUTO: 1.12 X10(3) UL (ref 1–4)
LYMPHOCYTES NFR BLD AUTO: 11.4 %
MCH RBC QN AUTO: 26.9 PG (ref 26–34)
MCHC RBC AUTO-ENTMCNC: 33.5 G/DL (ref 31–37)
MCV RBC AUTO: 80.3 FL
MONOCYTES # BLD AUTO: 0.74 X10(3) UL (ref 0.1–1)
MONOCYTES NFR BLD AUTO: 7.5 %
NEUTROPHILS # BLD AUTO: 7.91 X10 (3) UL (ref 1.5–7.7)
NEUTROPHILS # BLD AUTO: 7.91 X10(3) UL (ref 1.5–7.7)
NEUTROPHILS NFR BLD AUTO: 80.4 %
OSMOLALITY SERPL CALC.SUM OF ELEC: 292 MOSM/KG (ref 275–295)
PLATELET # BLD AUTO: 197 10(3)UL (ref 150–450)
POTASSIUM SERPL-SCNC: 4 MMOL/L (ref 3.5–5.1)
RBC # BLD AUTO: 4.42 X10(6)UL
SODIUM SERPL-SCNC: 140 MMOL/L (ref 136–145)
WBC # BLD AUTO: 9.8 X10(3) UL (ref 4–11)

## 2025-03-15 PROCEDURE — 85025 COMPLETE CBC W/AUTO DIFF WBC: CPT | Performed by: UROLOGY

## 2025-03-15 PROCEDURE — 94760 N-INVAS EAR/PLS OXIMETRY 1: CPT

## 2025-03-15 PROCEDURE — 82570 ASSAY OF URINE CREATININE: CPT | Performed by: UROLOGY

## 2025-03-15 PROCEDURE — 80048 BASIC METABOLIC PNL TOTAL CA: CPT | Performed by: UROLOGY

## 2025-03-15 NOTE — PROGRESS NOTES
[facility] Hospitalist Progress Note                                                                     [facility]   part of [facility]  8/17/1970    SUBJECTIVE: Patient denies any chest pain, palpitations, shortness of breath, cough, nausea, vomiting.  Patient postoperative pain controlled.  Patient ambulating.  Patient tolerating diet.    OBJECTIVE:  Temp:  [97.6 °F (36.4 °C)-99 °F (37.2 °C)] 98.5 °F (36.9 °C)  Pulse:  [62-84] 64  Resp:  [13-19] 18  BP: (112-149)/(67-96) 112/72  SpO2:  [95 %-100 %] 98 %  Exam  Gen: No acute distress, alert and oriented   Pulm: Lungs clear bilaterally, normal respiratory effort, no crackles, no wheezing   CV: Heart with regular rate and rhythm, no murmur.   Abd: Abdomen soft, post operative tenderness, nondistended, bowel sounds present  MSK: No significant pitting edema or tenderness of the lower extremities  Skin: no new rashes or lesions, surgical site c/d/i    Labs:   Recent Labs   Lab 03/15/25  0524   WBC 9.8   HGB 11.9*   MCV 80.3   .0       Recent Labs   Lab 03/15/25  0524      K 4.0      CO2 27.0   BUN 14   CREATSERUM 1.41*   CA 8.9   *       No results for input(s): \"ALT\", \"AST\", \"ALB\", \"AMYLASE\", \"LIPASE\", \"LDH\" in the last 168 hours.    Invalid input(s): \"ALPHOS\", \"TBIL\", \"DBIL\", \"TPROT\"    No results for input(s): \"PGLU\" in the last 168 hours.    Meds:   Scheduled:    atorvastatin  40 mg Oral Nightly    digoxin  125 mcg Oral Daily    sennosides  17.2 mg Oral Nightly    docusate sodium  100 mg Oral BID    acetaminophen  650 mg Oral Q4H While awake    famotidine  20 mg Oral BID    Or    famotidine  20 mg Intravenous BID     Continuous Infusions:    sodium chloride 125 mL/hr at 03/15/25 0850     PRN:   ondansetron    prochlorperazine    oxyCODONE **OR** oxyCODONE    HYDROmorphone **OR** HYDROmorphone    ASSESSMENT / PLAN:    54 year old male with history of atrial fibrillation,  allergic rhinitis, chronic back pain, kidney stones, hyperlipidemia presenting with left 6 cm centimeter kidney mass status post laparoscopic robotic assisted left partial nephrectomy.     Left 6 cm centimeter kidney mass  -POD # 1 status post laparoscopic robotic assisted left partial nephrectomy.  -urology following     Post operative Pain  -acetaminophen po prn   -Hydromorphone iv  -oxy po prn   -dc pain meds per urology      Atrial fibrillation   -digoxin     HLD  -atorvastatin      Quality:  DVT Prophylaxis: scd  CODE status:   Johnson: none     Plan of care discussed with patient and staff     Dispo: medically stable for discharge     Justo Thompson MD  [facility] Hospitalist   [phone]

## 2025-03-15 NOTE — PLAN OF CARE
Pt VSS, AOx4. Pt up with standby, ambulated the halls multiple times this evening with RN; steady gait. Pt on room air; denies EVELIA/SOB/lightheadedness. Pt using incentive spirometer; achieves approx 1500 ml. Pt tolerates full liquids with no nausea; abdomen mildly rounded. After walking this evening pt begins belching, no flatus yet. Pt reports pain in abdomen about a 6/10; scheduled tylenol so far. RN and pt discussed option for oxy PO, if needed. 2 lap sites, 1 small midline, 1 ELVA noted. Incisions c/d/glue intact/approximated. ELVA draining small amounts blood SS drainage. Spring cath in place, urine padma/yellow. Adequate amounts noted in spring cath standard drainage bag. Spring cath will remain in place until MD directions for next steps. SCDs re-applied for bed time. IV fluids continued, per orders. ABX continued. Fall & safety precautions in place. POC discussed.

## 2025-03-15 NOTE — PROGRESS NOTES
Notified Dr. Rainey that the patient voided 250 ml for his first void post Johnson catheter removal. Dr. Rainey also notified that the patient's ELVA Creatinine level came back at 1.20. Dr. Rainey stated that the patient's ELVA Drain can be removed and he can be discharged home.

## 2025-03-15 NOTE — PROGRESS NOTES
[facility]  Urology Progress Note    Angel Newberry Patient Status:  Outpatient in a Bed    1970 MRN [id number]   Location [facility] 3NW-A Attending Jacey Kimball MD   Hosp Day # 0 PCP Tristan Rutherford MD     Assessment:  Left renal mass. POD #1. Recovering well.    Plan: Probably home later today. Drain to be removed if drain creat OK.      Summary/Hospital course to date:    Subjective:  Angel Newberry is a(n) 54 year old male.    Current complaints: Minimal discomfort. No N,V    Objective: No acute distress.    Blood pressure 132/72, pulse 78, temperature 98.5 °F (36.9 °C), temperature source Oral, resp. rate 19, height 6' 3\" (1.905 m), weight 213 lb (96.6 kg), SpO2 100%.  Lungs: Respirations not labored.  Cardiac:No murmurs, gallops or rub.  Abdomen:Soft, Not distended, Active Bowel sounds.  Incisions:  Clean and dry.  :urine yellow  Neuro: Alert. Oriented  Lab Results   Component Value Date    WBC 9.8 03/15/2025    HGB 11.9 03/15/2025    .0 03/15/2025    CREATSERUM 1.41 03/15/2025     03/15/2025    K 4.0 03/15/2025    CO2 27.0 03/15/2025     03/15/2025    CA 8.9 03/15/2025       Mike Rainey M.D.  [facility], Urology   [phone]  3/15/2025  12:48 PM

## 2025-03-15 NOTE — DISCHARGE INSTRUCTIONS
You may resume taking your Aspirin on Friday March 21st.   No vigorous activity for or lifting over 15 lbs for 6 weeks.  OK to drive when moving around well enough to do so, in 1 week.  OK to go to work Thursday. Very Light duty only for 2 weeks  OK to shower.  Call if nausea, fever, or lightheadedness.

## (undated) DEVICE — FENESTRATED BIPOLAR FORCEPS: Brand: ENDOWRIST

## (undated) DEVICE — MONOPOLAR CURVED SCISSORS: Brand: ENDOWRIST

## (undated) DEVICE — AIRSEAL TRI-LUMEN LILTERED TUBE SET: Brand: AIRSEAL

## (undated) DEVICE — EVACUATOR SUR 100CC SIL BLB WND

## (undated) DEVICE — TRAY CATH 16FR F INCL BARDX IC COMPLT CARE

## (undated) DEVICE — VIOLET BRAIDED (POLYGLACTIN 910), SYNTHETIC ABSORBABLE SUTURE: Brand: COATED VICRYL

## (undated) DEVICE — BLADELESS OBTURATOR: Brand: WECK VISTA

## (undated) DEVICE — ROBOTIC GENERAL: Brand: MEDLINE INDUSTRIES, INC.

## (undated) DEVICE — SUT COAT VCRL + 0 54IN ABSRB UD ANTIBACT

## (undated) DEVICE — AEGIS 1" DISK 4MM HOLE, PEEL OPEN: Brand: MEDLINE

## (undated) DEVICE — COLUMN DRAPE

## (undated) DEVICE — INSUFFLATION NEEDLE TO ESTABLISH PNEUMOPERITONEUM.: Brand: INSUFFLATION NEEDLE

## (undated) DEVICE — ANTIBACTERIAL VIOLET BRAIDED (POLYGLACTIN 910), SYNTHETIC ABSORBABLE SUTURE: Brand: COATED VICRYL

## (undated) DEVICE — SUT COAT VCRL+ 0 27IN UR-6 ABSRB VLT ANTIBACT

## (undated) DEVICE — ANCHOR TISSUE RETRIEVAL SYSTEM, BAG SIZE 175 ML, PORT SIZE 10 MM: Brand: ANCHOR TISSUE RETRIEVAL SYSTEM

## (undated) DEVICE — SURGICEL SNOW HEMOSTAT 4X4IN ABSORBABLE

## (undated) DEVICE — DRAIN SUR 15FR L3/16IN DIA4.7MM SIL RND

## (undated) DEVICE — COVER,LIGHT,CAMERA,HARD,1/PK,STRL: Brand: MEDLINE

## (undated) DEVICE — DAVINCI XI CLIP HEM O LOK LARGE PURPLE

## (undated) DEVICE — ABSORBABLE WOUND CLOSURE DEVICE: Brand: V-LOC 90

## (undated) DEVICE — #15 STERILE STAINLESS BLADE: Brand: STERILE STAINLESS BLADES

## (undated) DEVICE — 3M™ TEGADERM™ TRANSPARENT FILM DRESSING FRAME STYLE, 1624W, 2-3/8 IN X 2-3/4 IN (6 CM X 7 CM), 100/CT 4CT/CASE: Brand: 3M™ TEGADERM™

## (undated) DEVICE — SEAL

## (undated) DEVICE — ANTIBACTERIAL UNDYED BRAIDED (POLYGLACTIN 910), SYNTHETIC ABSORBABLE SUTURE: Brand: COATED VICRYL

## (undated) DEVICE — LAPAROVUE VISIBILITY SYSTEM LAPAROSCOPIC SOLUTIONS: Brand: LAPAROVUE

## (undated) DEVICE — SUT ETHLN 2-0 18IN FS NABSRB BLK 26MM 3/8 CIR

## (undated) DEVICE — LARGE NEEDLE DRIVER: Brand: ENDOWRIST

## (undated) DEVICE — PROGRASP FORCEPS: Brand: ENDOWRIST

## (undated) DEVICE — SUT PROL 4-0 36IN RB-1 NABSRB BLU 17MM 1/2 CI

## (undated) DEVICE — SOLUTION IV 1000ML DIL ST H2O

## (undated) DEVICE — SUT MCRYL 4-0 18IN PS-2 ABSRB UD 19MM 3/8 CIR

## (undated) DEVICE — TIP COVER ACCESSORY

## (undated) DEVICE — ARM DRAPE

## (undated) DEVICE — 2, DISPOSABLE SUCTION/IRRIGATOR WITHOUT DISPOSABLE TIP: Brand: STRYKEFLOW

## (undated) DEVICE — GLOVE SUR 7.5 SENSICARE PI PIP CRM PWD F

## (undated) DEVICE — ADHESIVE SKIN TOP FOR WND CLSR DERMBND ADV

## (undated) DEVICE — AIRSEAL 12 MM ACCESS PORT AND PALM GRIP OBTURATOR WITH BLADELESS OPTICAL TIP, 120 MM LENGTH: Brand: AIRSEAL

## (undated) NOTE — LETTER
OUTSIDE TESTING RESULT REQUEST     IMPORTANT: FOR YOUR IMMEDIATE ATTENTION  Please FAX all test results listed below to: [phone]     Testing already done on or about: 2025     * * * * If testing is NOT complete, arrange with patient A.S.A.P. * * * *      Patient Name: Angel Newberry  Surgery Date: 3/14/2025  Medical Record: [id number]  CSN: [id number]  : 1970 - A: 54 y     Sex: male  Surgeon(s):  Jacey Kimball MD  Procedure: XI ROBOT-ASSISTED LAPAROSCOPIC LEFT PARTIAL NEPHRECTOMY  Anesthesia Type: General     Surgeon: Jacey Kimball MD     The following Testing and Time Line are REQUIRED PER ANESTHESIA     EKG READ AND SIGNED WITHIN   90 days      Thank You,   Sent by: [facility]